# Patient Record
Sex: MALE | Race: OTHER | Employment: UNEMPLOYED | URBAN - METROPOLITAN AREA
[De-identification: names, ages, dates, MRNs, and addresses within clinical notes are randomized per-mention and may not be internally consistent; named-entity substitution may affect disease eponyms.]

---

## 2021-09-29 ENCOUNTER — HOSPITAL ENCOUNTER (EMERGENCY)
Facility: HOSPITAL | Age: 31
Discharge: HOME | End: 2021-09-29
Attending: EMERGENCY MEDICINE
Payer: MEDICAID

## 2021-09-29 VITALS
RESPIRATION RATE: 18 BRPM | BODY MASS INDEX: 39.4 KG/M2 | DIASTOLIC BLOOD PRESSURE: 68 MMHG | HEIGHT: 68 IN | WEIGHT: 260 LBS | OXYGEN SATURATION: 99 % | TEMPERATURE: 98 F | SYSTOLIC BLOOD PRESSURE: 120 MMHG | HEART RATE: 68 BPM

## 2021-09-29 DIAGNOSIS — H60.12 CELLULITIS OF LEFT EARLOBE: Primary | ICD-10-CM

## 2021-09-29 PROCEDURE — 99283 EMERGENCY DEPT VISIT LOW MDM: CPT

## 2021-09-29 RX ORDER — AMOXICILLIN AND CLAVULANATE POTASSIUM 875; 125 MG/1; MG/1
1 TABLET, FILM COATED ORAL 2 TIMES DAILY
Qty: 20 TABLET | Refills: 0 | Status: SHIPPED | OUTPATIENT
Start: 2021-09-29 | End: 2021-09-29

## 2021-09-29 RX ORDER — CLINDAMYCIN HYDROCHLORIDE 300 MG/1
300 CAPSULE ORAL ONCE
Status: COMPLETED | OUTPATIENT
Start: 2021-09-29 | End: 2021-09-29

## 2021-09-29 RX ORDER — IBUPROFEN 600 MG/1
600 TABLET ORAL EVERY 8 HOURS PRN
Qty: 15 TABLET | Refills: 0 | Status: SHIPPED | OUTPATIENT
Start: 2021-09-29 | End: 2021-10-04

## 2021-09-29 RX ORDER — AMOXICILLIN AND CLAVULANATE POTASSIUM 875; 125 MG/1; MG/1
875 TABLET, FILM COATED ORAL ONCE
Status: DISCONTINUED | OUTPATIENT
Start: 2021-09-29 | End: 2021-09-29

## 2021-09-29 RX ORDER — CLINDAMYCIN HYDROCHLORIDE 300 MG/1
300 CAPSULE ORAL 3 TIMES DAILY
Qty: 30 CAPSULE | Refills: 0 | Status: SHIPPED | OUTPATIENT
Start: 2021-09-29 | End: 2021-10-09

## 2021-09-29 NOTE — ED INITIAL ASSESSMENT (HPI)
Left earlobe swelling with drainage began yesterday. pt also with redness and tenderness behind the ear as well.

## 2021-09-29 NOTE — ED PROVIDER NOTES
Patient Seen in: [facility] AND [facility] Emergency Department      History   Patient presents with:  Ear Problem Pain  Swelling Edema    Stated Complaint: ear swelling    Subjective:   HPI    27-year-old male here with his mom for evaluation of some left lower mild swelling anterior to this in the TMJ area. There is no significant redness in this area. The mastoid is nontender. The ear canals unremarkable. Neck: Normal range of motion. Neck supple. No lymphadenopathy.   No swelling  Cardiovascular: Normal ra

## 2021-10-01 NOTE — CM/SW NOTE
Received request to assist patient with f/u ENT appointment. Pt has appointment with Dr. Lorrie Spicer today 10/01/21 @ 1:20pm in the [facility] parking lot. Pt contacted  left with appointment information.

## 2024-02-17 ENCOUNTER — OFFICE VISIT (OUTPATIENT)
Dept: FAMILY MEDICINE CLINIC | Facility: CLINIC | Age: 34
End: 2024-02-17

## 2024-02-17 VITALS
WEIGHT: 279 LBS | SYSTOLIC BLOOD PRESSURE: 120 MMHG | RESPIRATION RATE: 16 BRPM | BODY MASS INDEX: 42.28 KG/M2 | TEMPERATURE: 99 F | DIASTOLIC BLOOD PRESSURE: 71 MMHG | OXYGEN SATURATION: 98 % | HEIGHT: 68 IN | HEART RATE: 76 BPM

## 2024-02-17 DIAGNOSIS — L30.8 OTHER ECZEMA: ICD-10-CM

## 2024-02-17 DIAGNOSIS — J01.00 ACUTE MAXILLARY SINUSITIS, RECURRENCE NOT SPECIFIED: Primary | ICD-10-CM

## 2024-02-17 PROCEDURE — 99203 OFFICE O/P NEW LOW 30 MIN: CPT | Performed by: FAMILY MEDICINE

## 2024-02-17 RX ORDER — AMOXICILLIN AND CLAVULANATE POTASSIUM 875; 125 MG/1; MG/1
1 TABLET, FILM COATED ORAL 2 TIMES DAILY
Qty: 14 TABLET | Refills: 0 | Status: SHIPPED | OUTPATIENT
Start: 2024-02-17 | End: 2024-02-24

## 2024-02-17 NOTE — PROGRESS NOTES
Subjective:   Robert Morales is a 33 year old male who presents for Consult (Cold symptoms for 3 days now. Denied fever )       History/Other:    Chief Complaint Reviewed and Verified  Nursing Notes Reviewed and   Verified  Tobacco Reviewed  Allergies Reviewed  Medications Reviewed    Problem List Reviewed         Tobacco:  He smoked tobacco in the past but quit greater than 12 months ago.  Social History    Tobacco Use      Smoking status: Former      Smokeless tobacco: Never       Current Outpatient Medications   Medication Sig Dispense Refill    amoxicillin clavulanate 875-125 MG Oral Tab Take 1 tablet by mouth 2 (two) times daily for 7 days. 14 tablet 0         Review of Systems:  Review of Systems   Constitutional:  Positive for fatigue.   HENT:  Positive for congestion, sinus pressure and sinus pain.    Respiratory:  Positive for cough.          Objective:   /71   Pulse 76   Temp 98.6 °F (37 °C) (Oral)   Resp 16   Ht 5' 8\" (1.727 m)   Wt 279 lb (126.6 kg)   SpO2 98%   BMI 42.42 kg/m²  Estimated body mass index is 42.42 kg/m² as calculated from the following:    Height as of this encounter: 5' 8\" (1.727 m).    Weight as of this encounter: 279 lb (126.6 kg).  Physical Exam  Vitals reviewed.   Constitutional:       Appearance: Normal appearance.   HENT:      Head: Normocephalic.      Right Ear: Tympanic membrane normal.      Left Ear: Tympanic membrane normal.      Nose:      Right Turbinates: Swollen.      Right Sinus: Maxillary sinus tenderness present.      Left Sinus: Maxillary sinus tenderness present.   Pulmonary:      Effort: Pulmonary effort is normal.      Breath sounds: Normal breath sounds.   Neurological:      Mental Status: He is alert.           Assessment & Plan:   1. Acute maxillary sinusitis, recurrence not specified (Primary)  2. Other eczema  -     DERM - INTERNAL  Other orders  -     Amoxicillin-Pot Clavulanate; Take 1 tablet by mouth 2 (two) times daily for 7 days.   Dispense: 14 tablet; Refill: 0  Add abx and decongestant.  See back for annual .  See derm for patch of dry inflammed skin and possible psoriasis.        No follow-ups on file.    Kenneth Guo DO, 2/17/2024, 8:46 AM

## 2024-10-07 ENCOUNTER — APPOINTMENT (OUTPATIENT)
Dept: GENERAL RADIOLOGY | Facility: HOSPITAL | Age: 34
End: 2024-10-07
Attending: EMERGENCY MEDICINE
Payer: COMMERCIAL

## 2024-10-07 ENCOUNTER — HOSPITAL ENCOUNTER (EMERGENCY)
Facility: HOSPITAL | Age: 34
Discharge: HOME | End: 2024-10-07
Attending: EMERGENCY MEDICINE
Payer: COMMERCIAL

## 2024-10-07 VITALS
WEIGHT: 285 LBS | HEART RATE: 56 BPM | BODY MASS INDEX: 43.19 KG/M2 | HEIGHT: 68 IN | SYSTOLIC BLOOD PRESSURE: 110 MMHG | TEMPERATURE: 97 F | DIASTOLIC BLOOD PRESSURE: 74 MMHG | RESPIRATION RATE: 16 BRPM | OXYGEN SATURATION: 97 %

## 2024-10-07 DIAGNOSIS — M54.41 ACUTE RIGHT-SIDED LOW BACK PAIN WITH RIGHT-SIDED SCIATICA: Primary | ICD-10-CM

## 2024-10-07 LAB
BILIRUB UR QL: NEGATIVE
CLARITY UR: CLEAR
COLOR UR: YELLOW
GLUCOSE UR-MCNC: NORMAL MG/DL
HGB UR QL STRIP.AUTO: NEGATIVE
KETONES UR-MCNC: NEGATIVE MG/DL
LEUKOCYTE ESTERASE UR QL STRIP.AUTO: NEGATIVE
NITRITE UR QL STRIP.AUTO: NEGATIVE
PH UR: 5.5 [PH] (ref 5–8)
PROT UR-MCNC: NEGATIVE MG/DL
SP GR UR STRIP: 1.03 (ref 1–1.03)
UROBILINOGEN UR STRIP-ACNC: NORMAL

## 2024-10-07 PROCEDURE — 81003 URINALYSIS AUTO W/O SCOPE: CPT | Performed by: EMERGENCY MEDICINE

## 2024-10-07 PROCEDURE — 96374 THER/PROPH/DIAG INJ IV PUSH: CPT

## 2024-10-07 PROCEDURE — 99284 EMERGENCY DEPT VISIT MOD MDM: CPT

## 2024-10-07 PROCEDURE — 72220 X-RAY EXAM SACRUM TAILBONE: CPT | Performed by: EMERGENCY MEDICINE

## 2024-10-07 PROCEDURE — 72100 X-RAY EXAM L-S SPINE 2/3 VWS: CPT | Performed by: EMERGENCY MEDICINE

## 2024-10-07 RX ORDER — LIDOCAINE 50 MG/G
1 PATCH TOPICAL EVERY 24 HOURS
Qty: 14 PATCH | Refills: 0 | Status: SHIPPED | OUTPATIENT
Start: 2024-10-07 | End: 2024-10-07

## 2024-10-07 RX ORDER — METHYLPREDNISOLONE 4 MG
TABLET, DOSE PACK ORAL
Qty: 21 TABLET | Refills: 0 | Status: SHIPPED | OUTPATIENT
Start: 2024-10-07

## 2024-10-07 RX ORDER — IBUPROFEN 600 MG/1
600 TABLET, FILM COATED ORAL EVERY 6 HOURS PRN
Qty: 28 TABLET | Refills: 0 | Status: SHIPPED | OUTPATIENT
Start: 2024-10-07 | End: 2024-10-07 | Stop reason: ALTCHOICE

## 2024-10-07 RX ORDER — KETOROLAC TROMETHAMINE 30 MG/ML
30 INJECTION, SOLUTION INTRAMUSCULAR; INTRAVENOUS ONCE
Status: COMPLETED | OUTPATIENT
Start: 2024-10-07 | End: 2024-10-07

## 2024-10-07 RX ORDER — CYCLOBENZAPRINE HCL 5 MG
5 TABLET ORAL 2 TIMES DAILY PRN
Qty: 14 TABLET | Refills: 0 | Status: SHIPPED | OUTPATIENT
Start: 2024-10-07 | End: 2024-10-07 | Stop reason: ALTCHOICE

## 2024-10-07 RX ORDER — CYCLOBENZAPRINE HCL 5 MG
5 TABLET ORAL 2 TIMES DAILY PRN
Qty: 14 TABLET | Refills: 0 | Status: SHIPPED | OUTPATIENT
Start: 2024-10-07

## 2024-10-07 RX ORDER — LIDOCAINE 50 MG/G
1 PATCH TOPICAL EVERY 24 HOURS
Qty: 14 PATCH | Refills: 0 | Status: SHIPPED | OUTPATIENT
Start: 2024-10-07

## 2024-10-07 RX ORDER — METHYLPREDNISOLONE 4 MG
TABLET, DOSE PACK ORAL
Qty: 21 TABLET | Refills: 0 | Status: SHIPPED | OUTPATIENT
Start: 2024-10-07 | End: 2024-10-07 | Stop reason: ALTCHOICE

## 2024-10-07 RX ORDER — IBUPROFEN 600 MG/1
600 TABLET, FILM COATED ORAL EVERY 6 HOURS PRN
Qty: 28 TABLET | Refills: 0 | Status: SHIPPED | OUTPATIENT
Start: 2024-10-07 | End: 2024-10-14

## 2024-10-07 NOTE — DISCHARGE INSTRUCTIONS
Thank you for seeking care at [facility] Emergency Department.  You have been seen and evaluated for back pain.      We discussed the results of your workup   Please read the instructions provided   If given prescriptions, take as instructed    Remember, your care process does not end after your visit today. Please follow-up with your doctor within 1-2 days for a follow-up check to ensure you are  improving, to see if you need any further evaluation/testing, or to evaluate for any alternate diagnoses.    Please return to the emergency department immediately if you develop severe pain that is not controlled by pain medications, loss of control of your legs, if you are unable to walk because of pain or weakness, worsening numbness or weakness, loss of bowel or bladder control (dribbling of urine or having accidents you wouldn't normally have), inability to urinate, numbness of your genital or anal area, fevers, abdominal pain, change in urination, or as these could all be signs of a serious medical emergency. Call or return to the ER for any other new or worsening symptoms.

## 2024-10-07 NOTE — ED PROVIDER NOTES
Patient Seen in: [facility] Emergency Department    History     Chief Complaint   Patient presents with    Back Pain     Stated Complaint: Back pain    HPI    Chief complaint: Back pain    History of present illness:  The patient is a pleasant 34M hx obesity, sciatica presents to ED with complaints of right low back pain, that began initially a few months ago but has been worsening for the past 3-4 days. Pt notes a remote history >10 years ago of sciatica for which he underwent physical therapy, had an MRI and was offered possible surgery but declined. He has noticed for a few months however he's been frequently having right lower back pain that intermittently radiates into the R thigh and upper leg. For 3-4 days the pain has been more constant. He states yesterday the pain briefly went into his right lower abdomen and right testicle, but the abdominal/testicular pain has since completely resolved and not reoccurred today. He denies any recent discrete trauma, injury or fall but tries to stay active. Pain is described as sharp and radiating, pain is worse with movement bending and is improved with remaining still.  Currently rated 8/10.    He tried a tens machine without relief and ibuprofen without relief. A review of pertinent red flag issues reveals no history of fever, IV drug use, urinary retention, history of immunosuppressive therapy, history of cancer or weight loss. No saddle anesthesia, perianal numbness, stool/bladder incontinence, no numbness/weakness/tingling in his extremities. He denies dysuria, hematuria, flank pain, chest pain, SOB, fever, chills, vomiting, diarrhea or other complaints.       Past Medical History:    Obesity    Sciatica       History reviewed. No pertinent surgical history.         No family history on file.    Social History     Socioeconomic History    Marital status:    Tobacco Use    Smoking status: Never    Smokeless tobacco: Never   Vaping Use    Vaping status:  Every Day   Substance and Sexual Activity    Alcohol use: Never    Drug use: Yes     Types: Cannabis     Comment: daily       Review of Systems    Positive for stated complaint: Back pain  Other systems are as noted in HPI.  Constitutional and vital signs reviewed.      All other systems reviewed and negative except as noted above.    PSFH elements reviewed from today and agreed except as otherwise stated in HPI.    Physical Exam     ED Triage Vitals [10/07/24 1510]   /80   Pulse 75   Resp 20   Temp 97.2 °F (36.2 °C)   Temp src Temporal   SpO2 100 %   O2 Device None (Room air)       Current:/74   Pulse 56   Temp 97.2 °F (36.2 °C) (Temporal)   Resp 16   Ht 172.7 cm (5' 8\")   Wt 129.3 kg   SpO2 97%   BMI 43.33 kg/m²     PULSE OX 97% on RA       General: Patient appears in mild distress, slow to transition  Neck: Supple, full range of motion, no midline bony tenderness or parasinal mm tenderness   CV: RRR no MRG, 2+ DP and radial pulses bilaterally   RESP: Lungs ctab no wheezes or crackles   Abdomen: Soft nontender nondistended, no mass or prominent aortic pulsation  ; no testicular tenderness or swelling, no inguinal hernia bilaterally  Back: Tender to palpation in  R lumbar paraspinal and R lateral sacral/piriformis region, no midline bony T or L spine tenderness, no stepoff nor deformity. There is no rash nor back erythema. There is decreased range of motion secondary to pain and spasm  Straight leg tests: positive at 30 degrees of R hip flexion on the right. Negative left.   Neuro: Patient is alert and oriented x3, able to ambulate with steady gait, 5 out of 5 strength bilateral lower extremities hips knees and ankle flexion and extension, dorsiflexion and plantarflexion of the foot preserved bilateral 5 out of 5 strength, sensation intact from sacral region throughout lower extremities down to the dorsal surface of the foot, 2+ DTRs of the knee and ankle  Extremities: Nontender full range of  motion in bilateral lower extremities, no leg edema        ED Course     Labs Reviewed   URINALYSIS WITH CULTURE REFLEX   RAINBOW DRAW LAVENDER   RAINBOW DRAW LIGHT GREEN   RAINBOW DRAW BLUE       MDM     This patient presents with acutely worsening R low back and buttock pain. Triage note describes abdominal and tesicular pain, though pt clarifies he is not having these symptoms today and they only occurred yesterday. Exam is as above, pt without midline bony tenderness, no abdominal nor testicular tenderness on exam.     Differential diagnosis includes musculoskeletal strain, sciatica, herniated disc, less likely UTI or kidney stone though considered in setting of his now resolved R sided abd/testicular pain yesterday I have extremely low suspicion for testicular torsion or acute appendicitis or other intraabdominal/ process.     A review of pertinent red flags from patient history reveals no history of fever or IV drug use. No bowel or bladder incontinence or retention. No history of immunosuppressive therapy, history of cancer or weight loss. No saddle anesthesia or perianal numbness. Low suspicion for cauda equina, conus medullaris, or other spinal cord compressive syndrome. Low suspicion for spinal epidural abscess.     Low suspicion based on patient exam, risk factors and presentation for AAA, dissection.     Plan: will obtain XR L spine and XR sacrum imaging, UA, administer multi-modal pain control medicines with toradol and lido patch for further disposition. Anticipate DC pending workup in ED.       ED Course as of 10/08/24 0135  ------------------------------------------------------------  Time: 10/07 1700  Comment: UA unremarkable  ------------------------------------------------------------  Time: 10/07 1712  Value: XR SACRUM + COCCYX (MIN 2 VIEWS) (CPT=72220)  Comment:   SACRUM:   Sacrum intact.  No acute fracture or destructive lesions.    COCCYX:   Coccyx intact.  No acute fracture or destructive  lesions.  SACRO ILIAC JOINTS: Well maintained.    OTHER: Negative.                Impression  CONCLUSION: No evidence of acute fracture or osseous malalignment.       ------------------------------------------------------------  Time: 10/07 1712  Value: XR LUMBAR SPINE (MIN 2 VIEWS) (CPT=72100)  Comment: ALIGNMENT: Normal alignment.    VERTEBRAL BODIES:   No acute fracture or malalignment.  Mild degenerative change with marginal osteophyte formation and facet arthropathy.  DISC SPACES: Mild L5-S1 disc space narrowing.  SACROILIAC JOINTS: Intact.  OTHER: Negative.                Impression  CONCLUSION:     No acute fracture or malalignment lumbar spine.  Mild lumbar spine degenerative change.    ------------------------------------------------------------  Time: 10/07 1713  Comment: Patient updated with results. Counseled him to follow-up as an outpatient with primary doctor and given his history follow-up with neurosurgery for possible further imaging, physical therapy.  Medrol Dosepak, ibuprofen, flexeril, lido patches sent to pharmacy for symptomatic relief.  Return if new or worsening symptoms occur or any change in condition.  He is comfortable with the plan for discharge and strict return precautions                  Disposition and Plan     Clinical Impression:  1. Acute right-sided low back pain with right-sided sciatica        Disposition:  Discharge    Follow-up:  Vanessa Salazar MD  [address]  [address]  [place] [address]  [phone]    Schedule an appointment as soon as possible for a visit in 2 day(s)      [facility] Emergency Department  [address]  [facility] [id number] [phone]  Go to  If symptoms worsen, immediately    Kota Escobar MD  [address]  [place] [address]  [phone]    Schedule an appointment as soon as possible for a visit in 1 week(s)        Medications Prescribed:  Discharge Medication List as of 10/7/2024  5:15 PM        START  taking these medications    Details   methylPREDNISolone 4 MG Oral Tablet Therapy Pack Take as directed on dose pack instructions, Normal, Disp-21 tablet, R-0      cyclobenzaprine 5 MG Oral Tab Take 1 tablet (5 mg total) by mouth 2 (two) times daily as needed., Normal, Disp-14 tablet, R-0      lidocaine 5 % External Patch Place 1 patch onto the skin daily., Normal, Disp-14 patch, R-0      ibuprofen 600 MG Oral Tab Take 1 tablet (600 mg total) by mouth every 6 (six) hours as needed., Normal, Disp-28 tablet, R-0             Present on Admission:  **None**    Britney Soares DO  Attending Physician  Emergency Medicine

## 2024-10-07 NOTE — ED INITIAL ASSESSMENT (HPI)
Pt to ED, steady gait, alert and orientated x4.   PT reporting pain in R lower back pain to legs, R lower stomach pain radiating to testicles, x 2 -3 days. No relief from tens machine. Pain stabbing and aching, pain 8/10.

## 2025-02-21 ENCOUNTER — ORDER TRANSCRIPTION (OUTPATIENT)
Dept: PHYSICAL THERAPY | Facility: HOSPITAL | Age: 35
End: 2025-02-21

## 2025-02-21 DIAGNOSIS — M54.16 LUMBAR RADICULOPATHY: Primary | ICD-10-CM

## 2025-03-06 ENCOUNTER — TELEPHONE (OUTPATIENT)
Dept: PHYSICAL THERAPY | Facility: HOSPITAL | Age: 35
End: 2025-03-06

## 2025-03-07 ENCOUNTER — OFFICE VISIT (OUTPATIENT)
Dept: PHYSICAL THERAPY | Age: 35
End: 2025-03-07
Attending: NURSE PRACTITIONER
Payer: COMMERCIAL

## 2025-03-07 DIAGNOSIS — M54.16 LUMBAR RADICULOPATHY: Primary | ICD-10-CM

## 2025-03-07 PROCEDURE — 97110 THERAPEUTIC EXERCISES: CPT | Performed by: PHYSICAL THERAPIST

## 2025-03-07 PROCEDURE — 97162 PT EVAL MOD COMPLEX 30 MIN: CPT | Performed by: PHYSICAL THERAPIST

## 2025-03-07 NOTE — PROGRESS NOTES
SPINE EVALUATION:     Diagnosis:   Lumbar radiculopathy (M54.16) Patient:  Moises Holloway        Referring Provider: Kristine Fry Nurse practitioner Today's Date   3/7/2025    Precautions:  None   Date of Evaluation: 03/07/25  Next MD visit: 3/17/2025  Date of Surgery: none schedules     PATIENT SUMMARY   Summary of chief complaints: back tightness R side-   History of current condition: Back tightness R side from 2013 states no noted injury  - had sciatica. Had therapy and injections with no help- lived with R side LBP occasional pain into side and front of lower leg.  Presently back pain - sometimes tingling side of R leg - can come at anytime - sitting-/standing/walking. States had injection early January with no help. Has attempted exercise in the past but always made things worse.  States MD is looking at surgery to help him.   Pain level: current 4 /10, at best 4 /10, at worst 7 /10  Description of symptoms: tightness at lower R back, tingling 20% of the time - various positions   Occupation:  - sitting- sits up to 1 hour   Leisure activities/Hobbies:  kids   Prior level of function: prior to 2013 no problems  Current limitations: walking > 30-45 minutes, lifting up kids, unable to exercise, sitting > 1 hour , unable to carry.  Pt goals: 1. States get some exercises to see if can help with the pain  Past medical history was reviewed with Moises.    Imaging/Tests:   Has had MRI  Moises  has a past medical history of Obesity and Sciatica.  He  has no past surgical history on file.    ASSESSMENT  Moises presents to physical therapy evaluation with primary c/o back tightness R side. The results of the objective tests and measures show  impaired lumbar mobility flex/extension, nerve restriction LE R to L, impaired core strength, impaired hip abd strength R to L, tenderness R lower lumbar region to R and c/o pain and intermittent radiculopathy R leg.. Functional deficits  include but are not limited to walking > 30-45 minutes, lifting up kids, unable to exercise, sitting > 1 hour , unable to carry.. Signs and symptoms are consistent with diagnosis of Lumbar radiculopathy (M54.16). Pt and PT discussed evaluation findings, pathology, POC and HEP.  Pt voiced understanding and performs HEP correctly without reported pain. Skilled Physical Therapy is medically necessary to address the above impairments and reach functional goals.    OBJECTIVE:    Musculoskeletal:  Observation/Posture:  fair sitting posture with frwd head/flexed lumbar spine, forward shoulders.      Palpation:  tenderness R lower lumbar region to L     Repeated movements lumbar spine:  Standing Baseline:  R side LBP/tightness.  Repeated flexion - NE   Repeated Extension-  I/W tightness R side  Prone - R side tightness, JOCELIN- I/NW prone press ups I/NW     Supine hook lying - D/B    repeated flexion- NE      ROM and Strength:  (* denotes performed with pain)  Trunk ROM MMT (-/5)     Flex to just below knee       Ext mod LOM      R L R L     Side bend WNL mild LOM R side tightness         Rotation WNL WNL       ,   Hip   ROM MMT (-/5)    R L R L     Flex (L2)  WNL  WNL  5/5  5/5     Ext   WNL  WNL  5/5- P, NW tingling  5/5     Abd  WNL  WNL  4/5  5/5     ER  WNL  WNL  5/5  5/5     IR  WNL  WNL  4+/5  5/5    ,   Knee   ROM MMT (-/5)    R L R L     Flex  WNL  WNL  5/5  5/5     Ext (L3)  WNL  WNL  /5  5/5     ,   Ankle/Foot   ROM MMT (-/5)    R L R L     PF  WNL  WNL         DF (L4)  WNL  WNL  5/5  5/5     Inversion  WNL  WNL  5/5  5/5     Eversion  WNL  WNL  5/5  5/5     Grt Toe Ext (L5)               Flexibility:  LE Flexibility R L     Hip Flexor min restricted min restricted     Hamstrings significantly restricted, (+) SLR mod restricted, -35     ITB not tested not tested     Piriformis not tested not tested     Quads WNL WNL     Gastroc-soleus           Neurological:  Sensation:  intact           Balance and Functional  Mobility:  Gait: pt ambulates on level ground with  normal pattern.          Today's Treatment and Response:   Pt education was provided on exam findings, treatment diagnosis, treatment plan, expectations, and prognosis.  Today's Treatment       3/7/2025   Spine Treatment   Therapeutic Exercise Minutes 10   Evaluation Minutes 40   Total Time Of Timed Procedures 10   Total Time Of Service-Based Procedures 40   Total Treatment Time 50   HEP Seated nerve glides  Lower abd core with march        Patient was instructed in and issued a HEP for: Slump -Seated nerve glides  Lower abd core with march    Charges:  PT EVAL:  ,    In agreement with evaluation findings and clinical rationale, this evaluation involved MODERATE COMPLEXITY decision making due to 1-2 personal factors/comorbidities, 3 or more body structures involved/activity limitations, and evolving symptoms as documented in the evaluation.                                                                         PLAN OF CARE:    Goals: (to be met in 8 visits)   Patient to be independent with HEP  Patient to report decreased radicular symptoms into R leg  Patient to report 25-50% decrease in R side back tightness with daily activities.     Frequency / Duration: Patient will be seen 2x/week or a total of 6- 8  visits over a 90 day period. Treatment will include: Therapeutic Exercise, HEP    Education or treatment limitation: None   Rehab Potential: fair     Oswestry Disability Index Score  Score: (Patient-Rptd) 36 % (3/7/2025  7:56 AM)      Patient was advised of these findings, precautions, and treatment options and has agreed to actively participate in planning and for this course of care.    Thank you for your referral. Please co-sign or sign and return this letter via fax as soon as possible to [phone]. If you have any questions, please contact me at Dept: [phone]    Sincerely,  Electronically signed by therapist: Jennifer Lay, PT  Physician's  certification required: Yes  I certify the need for these services furnished under this plan of treatment and while under my care.    X___________________________________________________ Date____________________    Certification From: 3/7/2025  To:6/5/2025

## 2025-03-10 ENCOUNTER — TELEPHONE (OUTPATIENT)
Dept: PHYSICAL THERAPY | Facility: HOSPITAL | Age: 35
End: 2025-03-10

## 2025-03-11 ENCOUNTER — APPOINTMENT (OUTPATIENT)
Dept: PHYSICAL THERAPY | Age: 35
End: 2025-03-11
Attending: NURSE PRACTITIONER
Payer: COMMERCIAL

## 2025-03-13 ENCOUNTER — OFFICE VISIT (OUTPATIENT)
Dept: PHYSICAL THERAPY | Age: 35
End: 2025-03-13
Attending: NURSE PRACTITIONER
Payer: COMMERCIAL

## 2025-03-13 PROCEDURE — 97110 THERAPEUTIC EXERCISES: CPT | Performed by: PHYSICAL THERAPIST

## 2025-03-13 NOTE — PROGRESS NOTES
Patient: Moises Holloway  Referring Provider:  Insurance:   Diagnosis: Lumbar radiculopathy (M54.16) Kristine BELTRAN   Date of Surgery: N/A Next MD visit:  N/A   Precautions:  None March 17, 2025 Referral Information:    Date of Evaluation: Req: 0, Auth: 0, Exp:     03/07/25 POC Auth Visits:  8       Today's Date   3/13/2025    Subjective  Patient reports having pain R side of back today, no reported tingling into the leg.       Pain: 4/10  R side back     Objective  see outpatient daily record          Assessment   Patient given additional stabilization and flexibility exercises this session.  Only complaint at time was R side back stiffness, NW by end of session.  Able to demonstrate all exercises correctly.     Goals (to be met in 8 visits)   Patient to be independent with HEP  Patient to report decreased radicular symptoms into R leg  Patient to report 25-50% decrease in R side back tightness with daily activities.         Plan   Continue flexibility, stabilization exercises.      Treatment Last 4 Visits       3/13/2025   PT Treatment   Treatment Day 2          3/7/2025 3/13/2025   Spine Treatment   Treatment Day  2   Therapeutic Exercise  Nu step 5 min level 5  Nerve glides - slump position x 10  Piriformis stretch long sitting   March x 10 each leg  Dead bug x 10 reps   Side glut med 2 x 10 YTB with abdominal activation  Bridges x 10  Single knee to chest x 10  Isometric hip flex supine x 8 each  Hip add with ball with abd activation  Hook lying rotation  4 point hip extension x 10 each  Seated HS stretch x 30 seconds     Therapeutic Exercise Minutes 10 40   Evaluation Minutes 40    Total Time Of Timed Procedures 10 40   Total Time Of Service-Based Procedures 40 0   Total Treatment Time 50 40   HEP Seated nerve glides  Lower abd core with march         HEP  Seated HS stretch, hip add with abd activation, dead bug, side glut med with abd activation, isometric hip flex supine, SKTC, hook lying  rotation, 4 point hip extension    Charges      Ex 3

## 2025-03-14 ENCOUNTER — APPOINTMENT (OUTPATIENT)
Dept: PHYSICAL THERAPY | Age: 35
End: 2025-03-14
Attending: NURSE PRACTITIONER
Payer: COMMERCIAL

## 2025-03-17 ENCOUNTER — OFFICE VISIT (OUTPATIENT)
Dept: SURGERY | Facility: CLINIC | Age: 35
End: 2025-03-17

## 2025-03-17 VITALS — HEART RATE: 72 BPM | DIASTOLIC BLOOD PRESSURE: 90 MMHG | SYSTOLIC BLOOD PRESSURE: 118 MMHG

## 2025-03-17 DIAGNOSIS — M25.531 WRIST PAIN, ACUTE, RIGHT: Primary | ICD-10-CM

## 2025-03-17 PROCEDURE — 99203 OFFICE O/P NEW LOW 30 MIN: CPT | Performed by: SURGERY

## 2025-03-17 RX ORDER — NAPROXEN 500 MG/1
500 TABLET ORAL 2 TIMES DAILY WITH MEALS
Qty: 30 TABLET | Refills: 0 | Status: SHIPPED | OUTPATIENT
Start: 2025-03-17 | End: 2025-04-01

## 2025-03-17 RX ORDER — GABAPENTIN 300 MG/1
1 CAPSULE ORAL 3 TIMES DAILY
COMMUNITY
Start: 2024-12-09

## 2025-03-17 RX ORDER — GABAPENTIN 100 MG/1
100 CAPSULE ORAL
COMMUNITY
Start: 2025-01-28

## 2025-03-17 NOTE — H&P
History and Physical      HPI     Chief Complaint   Patient presents with    Consult     Cyst on right wrist       HPI  Moises Holloway is a 34 year old male who presents with right wrist and proximal forearm tendinitis.  He admits to numbness and weakness in this area.  He had an MRI at an offsite I cannot review the report.  Clinically not consistent with ganglion cyst for which she was referred.  Will send to hand surgery for evaluation possible injection.    Past Medical History:    Obesity    Sciatica     No past surgical history on file.  Current Outpatient Medications   Medication Sig Dispense Refill    gabapentin 100 MG Oral Cap Take 1 capsule (100 mg total) by mouth.      gabapentin 300 MG Oral Cap Take 1 capsule (300 mg total) by mouth 3 (three) times daily.      naproxen 500 MG Oral Tab Take 1 tablet (500 mg total) by mouth 2 (two) times daily with meals for 30 doses. 30 tablet 0    methylPREDNISolone 4 MG Oral Tablet Therapy Pack Take as directed on dose pack instructions (Patient not taking: Reported on 3/17/2025) 21 tablet 0    cyclobenzaprine 5 MG Oral Tab Take 1 tablet (5 mg total) by mouth 2 (two) times daily as needed. (Patient not taking: Reported on 3/17/2025) 14 tablet 0    lidocaine 5 % External Patch Place 1 patch onto the skin daily. (Patient not taking: Reported on 3/17/2025) 14 patch 0     ALLERGIES  Allergies[1]    Social History     Socioeconomic History    Marital status:    Tobacco Use    Smoking status: Never     Passive exposure: Never    Smokeless tobacco: Never   Vaping Use    Vaping status: Every Day   Substance and Sexual Activity    Alcohol use: Never    Drug use: Yes     Types: Cannabis     Comment: daily     No family history on file.    Review of Systems   A comprehensive 10 point review of systems was completed.  Pertinent positives and negatives noted in the the HPI.    PHYSICAL EXAM   /90 (BP Location: Right arm, Patient Position: Sitting, Cuff Size:  large)   Pulse 72  No LMP for male patient.   Constitutional: appears well hydrated alert and responsive no acute distress noted  Head/Face: normocephalic  Nose/Mouth/Throat: nose and throat are clear palate is intact mucous membranes are moist no oral lesions are noted  Neck/Thyroid: neck is supple without adenopathy  Respiratory: normal to inspection lungs are clear to auscultation bilaterally normal respiratory effort  Cardiovascular: regular rate and rhythm no murmurs, gallups, or rubs  Abdomen: soft non-tender non-distended no organomegaly noted no masses  Extremities: no edema, cyanosis, or clubbing  Tender near anatomical snuffbox  Neurological: exam appropriate for age reflexes and motor skills appropriate for age      ASSESSMENT/PLAN   Assessment       34 year old male with probable tendinitis  We have discussed the surgical risks, benefits, alternatives, and expected recovery. We will plan evaluation with hand surgery. All of the patient's questions have been answered to his satisfaction.       3/17/2025  Carl Martel MD               [1] No Known Allergies

## 2025-03-17 NOTE — PATIENT INSTRUCTIONS
Please take naproxen 500 mg twice a day.    Make an appointment with Dr. Yanick Aggarwal from hand surgery [phone]

## 2025-03-25 ENCOUNTER — OFFICE VISIT (OUTPATIENT)
Dept: PHYSICAL THERAPY | Age: 35
End: 2025-03-25
Attending: NURSE PRACTITIONER
Payer: COMMERCIAL

## 2025-03-25 PROCEDURE — 97110 THERAPEUTIC EXERCISES: CPT | Performed by: PHYSICAL THERAPIST

## 2025-03-25 NOTE — PROGRESS NOTES
Patient: Moises Holloway   Referring Provider:  Insurance:   Diagnosis: Lumbar radiculopathy (M54.16) Kristine Fry   NP [employer]   Date of Surgery: No data recorded Next MD visit:  N/A   Precautions:  None No data recorded Referral Information:    Date of Evaluation: Req: 0, Auth: 0, Exp:     03/07/25 POC Auth Visits:  8       Today's Date   3/25/2025    Subjective  Patient reports 5/10 today, more locked up that usual.  No reported tingling into the leg. Did see MD and said to continue out the therapy.  Will see again in mid April and plan the surgery.       Pain: 5/10     Objective  see outpatient daily record          Assessment   Patient able to complete all exercises without c/o tingling into leg, pain levels end of session.  Did report less stiffness at the back end of session.     Goals (to be met in 8 visits)   Patient to be independent with HEP  Patient to report decreased radicular symptoms into R leg  Patient to report 25-50% decrease in R side back tightness with daily activities.             Plan  Continue with core strengthening, flexibility work.    Treatment Last 4 Visits       3/13/2025 3/25/2025   PT Treatment   Treatment Day 2 3          3/7/2025 3/13/2025 3/25/2025   Spine Treatment   Treatment Day  2 3   Therapeutic Exercise  Nu step 5 min level 5  Nerve glides - slump position x 10  Piriformis stretch long sitting   March x 10 each leg  Dead bug x 10 reps   Side glut med 2 x 10 YTB with abdominal activation  Bridges x 10  Single knee to chest x 10  Isometric hip flex supine x 8 each  Hip add with ball with abd activation  Hook lying rotation  4 point hip extension x 10 each  Seated HS stretch x 30 seconds   Nu step 5 min level 5  Nerve glides - slump position x 10  HS stretch at step 2 x 45 seconds R, 1 x 45 sec L.  Piriformis stretch supine 2 each leg  March x 10 each leg with abd activation  Dead bug x 10 reps   Side glut med 2 x 10 YTB with abdominal activation  Bridges x 10  Single  knee to chest x 10 reps  Isometric hip flex supine x 8 each  Hip add with ball with abd activation x 12 reps  Hip Abd with pro [place] x 12 reps with abd activation  Hook lying rotation  4 point hip extension x 12 each  Seated HS stretch x 30 seconds  Standing hip abd YTB x 12 each leg  Standing hip ext YTB x 12 each leg   Therapeutic Exercise Minutes 10 40 40   Evaluation Minutes 40     Total Time Of Timed Procedures 10 40 40   Total Time Of Service-Based Procedures 40 0 0   Total Treatment Time 50 40 40   HEP Seated nerve glides  Lower abd core with march          HEP      Charges  Ex 3

## 2025-03-28 ENCOUNTER — OFFICE VISIT (OUTPATIENT)
Dept: PHYSICAL THERAPY | Age: 35
End: 2025-03-28
Attending: NURSE PRACTITIONER
Payer: COMMERCIAL

## 2025-03-28 PROCEDURE — 97110 THERAPEUTIC EXERCISES: CPT | Performed by: PHYSICAL THERAPIST

## 2025-03-28 NOTE — PROGRESS NOTES
Patient: Moises Holloway  Referring Provider:  Insurance:   Diagnosis: Lumbar radiculopathy (M54.16) Kristine BELTRAN   Date of Surgery: NA Next MD visit:  N/A   Precautions:  None No data recorded Referral Information:    Date of Evaluation: Req: 0, Auth: 0, Exp:     03/07/25 POC Auth Visits:  4/8       Today's Date   3/28/2025    Subjective  Patient reports more tender today than normal.  States just woke up that way.  States overall has continued to be tight. Exercises help a short time, then tightens again  Patient reports after exercises sometimes good at back and sometimes gets tighter.        Pain: 6/10     Objective  see outpatient daily record          Assessment   Patient reporting ongoing tightness at times and some help for a time with the stretches, but returns.  Added standing flexion to HEP    Goals (to be met in 8 visits)   Patient to be independent with HEP  Patient to report decreased radicular symptoms into R leg  Patient to report 25-50% decrease in R side back tightness with daily activities.                 Plan   Continue LE flexibility, nerve glides, core strengthening, LE strengthening/stabilization.    Treatment Last 4 Visits  Treatment Day: 4       3/7/2025 3/13/2025 3/25/2025 3/28/2025   Spine Treatment   Therapeutic Exercise  Nu step 5 min level 5  Nerve glides - slump position x 10  Piriformis stretch long sitting   March x 10 each leg  Dead bug x 10 reps   Side glut med 2 x 10 YTB with abdominal activation  Bridges x 10  Single knee to chest x 10  Isometric hip flex supine x 8 each  Hip add with ball with abd activation  Hook lying rotation  4 point hip extension x 10 each  Seated HS stretch x 30 seconds   Nu step 5 min level 5  Nerve glides - slump position x 10  HS stretch at step 2 x 45 seconds R, 1 x 45 sec L.  Piriformis stretch supine 2 each leg  March x 10 each leg with abd activation  Dead bug x 10 reps   Side glut med 2 x 10 YTB with abdominal  activation  Bridges x 10  Single knee to chest x 10 reps  Isometric hip flex supine x 8 each  Hip add with ball with abd activation x 12 reps  Hip Abd with pro [place] x 12 reps with abd activation  Hook lying rotation  4 point hip extension x 12 each  Seated HS stretch x 30 seconds  Standing hip abd YTB x 12 each leg  Standing hip ext YTB x 12 each leg Nu step 5 min level 5  HS stretch at step 2 x 45   Nerve glides - slump position x 10    Piriformis stretch supine 2 each leg- 30 sec hold  March x 10 each leg with abd activation  SLR with abd activation x 10 each leg  Dead bug x 10 reps - 8# weight held in both arms  Side glut med 2 x 10 YTB with abdominal activation  Bridges x 10  Single knee to chest x 10 reps  Isometric hip flex supine x 8 each  Hip add with ball with abd activation x 15 reps  Hip Abd with pro [place] x 12 reps with abd activation  Hook lying rotation x 10  4 point hip extension x 12 each  Standing flexion x 5 - DB walking  Standing hip abd YTB x 15 each leg  Standing hip ext YTB x 12 each leg   Therapeutic Exercise Minutes 10 40 40 40   Evaluation Minutes 40      Total Time Of Timed Procedures 10 40 40 40   Total Time Of Service-Based Procedures 40 0 0 0   Total Treatment Time 50 40 40 40   HEP Seated nerve glides  Lower abd core with march   Standing flexion x 5 reps through day.        HEP  Standing flexion x 5 reps through day.    Charges      Ex 3

## 2025-04-01 ENCOUNTER — TELEPHONE (OUTPATIENT)
Dept: PHYSICAL THERAPY | Facility: HOSPITAL | Age: 35
End: 2025-04-01

## 2025-04-01 ENCOUNTER — APPOINTMENT (OUTPATIENT)
Dept: PHYSICAL THERAPY | Age: 35
End: 2025-04-01
Attending: NURSE PRACTITIONER
Payer: COMMERCIAL

## 2025-04-08 ENCOUNTER — OFFICE VISIT (OUTPATIENT)
Dept: PHYSICAL THERAPY | Age: 35
End: 2025-04-08
Attending: NURSE PRACTITIONER
Payer: COMMERCIAL

## 2025-04-08 PROCEDURE — 97110 THERAPEUTIC EXERCISES: CPT | Performed by: PHYSICAL THERAPIST

## 2025-04-08 NOTE — PROGRESS NOTES
Patient: Moises Holloway  Referring Provider:  Insurance:   Diagnosis: Lumbar radiculopathy (M54.16) Kristine BELTRAN   Date of Surgery: NA Next MD visit:  N/A   Precautions:  None No data recorded Referral Information:    Date of Evaluation: Req: 0, Auth: 0, Exp:     03/07/25 POC Auth Visits:  8       Today's Date   4/8/2025    Subjective   Patient reports tightness continues.  States had to fly out to [place] last week and that made back tight along with walking over the last weekend.  .         Pain: 6/106/10 in the back R    Objective     See outpatient daily record.           Assessment   Continued stabilization and flexibility work.  End of session reporting 4/10 levels.  Added theraball for bridges this session.     Goals (to be met in 8 visits)   Patient to be independent with HEP  Patient to report decreased radicular symptoms into R leg  Patient to report 25-50% decrease in R side back tightness with daily activities.                     Plan   Continue stabilization, strengthening, core, flexibility work.    Treatment Last 4 Visits  Treatment Day: 5       3/13/2025 3/25/2025 3/28/2025 4/8/2025   Spine Treatment   Therapeutic Exercise Nu step 5 min level 5  Nerve glides - slump position x 10  Piriformis stretch long sitting   March x 10 each leg  Dead bug x 10 reps   Side glut med 2 x 10 YTB with abdominal activation  Bridges x 10  Single knee to chest x 10  Isometric hip flex supine x 8 each  Hip add with ball with abd activation  Hook lying rotation  4 point hip extension x 10 each  Seated HS stretch x 30 seconds   Nu step 5 min level 5  Nerve glides - slump position x 10  HS stretch at step 2 x 45 seconds R, 1 x 45 sec L.  Piriformis stretch supine 2 each leg  March x 10 each leg with abd activation  Dead bug x 10 reps   Side glut med 2 x 10 YTB with abdominal activation  Bridges x 10  Single knee to chest x 10 reps  Isometric hip flex supine x 8 each  Hip add with ball with abd activation  x 12 reps  Hip Abd with pro [place] x 12 reps with abd activation  Hook lying rotation  4 point hip extension x 12 each  Seated HS stretch x 30 seconds  Standing hip abd YTB x 12 each leg  Standing hip ext YTB x 12 each leg Nu step 5 min level 5  HS stretch at step 2 x 45   Nerve glides - slump position x 10    Piriformis stretch supine 2 each leg- 30 sec hold  March x 10 each leg with abd activation  SLR with abd activation x 10 each leg  Dead bug x 10 reps - 8# weight held in both arms  Side glut med 2 x 10 YTB with abdominal activation  Bridges x 10  Single knee to chest x 10 reps  Isometric hip flex supine x 8 each  Hip add with ball with abd activation x 15 reps  Hip Abd with pro [place] x 12 reps with abd activation  Hook lying rotation x 10  4 point hip extension x 12 each  Standing flexion x 5 - DB walking  Standing hip abd YTB x 15 each leg  Standing hip ext YTB x 12 each leg Nu step 5 min level 6  HS stretch at step 2 x 45   Nerve glides - slump position x 15     Piriformis stretch supine 2 each leg- 30 sec hold  March x 10 each leg with abd activation  SLR with abd activation x 10 each leg  Dead bug x 10 reps - 10# weight held in both arms  Side glut med 2 x 15 RTB with abdominal activation  Bridges on theraball 2 x 10  Single knee to chest x 10 reps  Isometric hip flex supine x 10 each  Hip add with ball with abd activation x 10 reps  Hip Abd with pro [place] x 10 reps with abd activation  4 point hip extension x 10each  Standing flexion x 5 - DB walking  Standing hip abd YTB x 10 each leg  Standing hip ext YTB x 10 each leg   Therapeutic Exercise Minutes 40 40 40 40   Total Time Of Timed Procedures 40 40 40 40   Total Time Of Service-Based Procedures 0 0 0 0   Total Treatment Time 40 40 40 40   HEP   Standing flexion x 5 reps through day.         HEP  No changes    Charges   Ex 3

## 2025-04-10 ENCOUNTER — OFFICE VISIT (OUTPATIENT)
Dept: PHYSICAL THERAPY | Age: 35
End: 2025-04-10
Attending: NURSE PRACTITIONER
Payer: COMMERCIAL

## 2025-04-10 PROCEDURE — 97110 THERAPEUTIC EXERCISES: CPT | Performed by: PHYSICAL THERAPIST

## 2025-04-10 NOTE — PROGRESS NOTES
Patient: Moises Holloway (34 year old, male) Referring Provider:  Insurance:   Diagnosis: Lumbar radiculopathy (M54.16) Kristine BELTRAN   Date of Surgery: No data recorded Next MD visit:  N/A   Precautions:  None No data recorded Referral Information:    Date of Evaluation: Req: 0, Auth: 0, Exp:     03/07/25 POC Auth Visits:  8       Today's Date   4/10/2025    Subjective   Patient reports the same overall. States next day after therapy sometimes will feel leg irritated.  States still notices weakness at R leg compared to L       Pain: 4/10     Objective       See outpatient daily record.      Assessment   Continued LE flexibility, strengthening/core stabilization work.     Goals (to be met in 8 visits)   Patient to be independent with HEP  Patient to report decreased radicular symptoms into R leg  Patient to report 25-50% decrease in R side back tightness with daily activities.                       Plan  Continue with core strengthening, flexibility work.    Treatment Last 4 Visits  Treatment Day: 6       3/25/2025 3/28/2025 4/8/2025 4/10/2025   Spine Treatment   Therapeutic Exercise Nu step 5 min level 5  Nerve glides - slump position x 10  HS stretch at step 2 x 45 seconds R, 1 x 45 sec L.  Piriformis stretch supine 2 each leg  March x 10 each leg with abd activation  Dead bug x 10 reps   Side glut med 2 x 10 YTB with abdominal activation  Bridges x 10  Single knee to chest x 10 reps  Isometric hip flex supine x 8 each  Hip add with ball with abd activation x 12 reps  Hip Abd with pro [place] x 12 reps with abd activation  Hook lying rotation  4 point hip extension x 12 each  Seated HS stretch x 30 seconds  Standing hip abd YTB x 12 each leg  Standing hip ext YTB x 12 each leg Nu step 5 min level 5  HS stretch at step 2 x 45   Nerve glides - slump position x 10    Piriformis stretch supine 2 each leg- 30 sec hold  March x 10 each leg with abd activation  SLR with abd activation x 10 each leg  Dead  bug x 10 reps - 8# weight held in both arms  Side glut med 2 x 10 YTB with abdominal activation  Bridges x 10  Single knee to chest x 10 reps  Isometric hip flex supine x 8 each  Hip add with ball with abd activation x 15 reps  Hip Abd with pro [place] x 12 reps with abd activation  Hook lying rotation x 10  4 point hip extension x 12 each  Standing flexion x 5 - DB walking  Standing hip abd YTB x 15 each leg  Standing hip ext YTB x 12 each leg Nu step 5 min level 6  HS stretch at step 2 x 45   Nerve glides - slump position x 15     Piriformis stretch supine 2 each leg- 30 sec hold  March x 10 each leg with abd activation  SLR with abd activation x 10 each leg  Dead bug x 10 reps - 10# weight held in both arms  Side glut med 2 x 15 RTB with abdominal activation  Bridges on theraball 2 x 10  Single knee to chest x 10 reps  Isometric hip flex supine x 10 each  Hip add with ball with abd activation x 10 reps  Hip Abd with pro [place] x 10 reps with abd activation  4 point hip extension x 10each  Standing flexion x 5 - DB walking  Standing hip abd YTB x 10 each leg  Standing hip ext YTB x 10 each leg Nu step 5 min level 6  HS stretch at step 2 x 45   Nerve glides - slump position x 15     Piriformis stretch supine 2 each leg- 30 sec hold  March x 10 each leg with abd activation  SLR with abd activation 2 x 10 each leg  Dead bug x 10 reps - 10# weight held in both arms  Theraball isometric abd x 15 R hand to L knee, L hand to R knee  Side glut med 2 x 15 RTB with abdominal activation  Bridges on theraball 2 x 10  Single knee to chest x 10 reps  Hip add with ball with abd activation x 10 reps  Hip Abd with pro [place] x 15 reps with abd activation  4 point hip extension x 12 each  Standing slant board stretchx 45 seconds  Standing flexion x 5 -   Standing RF stretch 30 sec each leg.  Standing hip abd RTB x 15 each leg  Standing hip ext YTB x 10 each leg     Therapeutic Exercise Minutes 40 40 40    Total Time Of Timed  Procedures 40 40 40 0   Total Time Of Service-Based Procedures 0 0 0 0   Total Treatment Time 40 40 40 0   HEP  Standing flexion x 5 reps through day.          HEP  No changes    Charges   Ex 3

## 2025-04-15 ENCOUNTER — OFFICE VISIT (OUTPATIENT)
Dept: PHYSICAL THERAPY | Age: 35
End: 2025-04-15
Attending: INTERNAL MEDICINE
Payer: COMMERCIAL

## 2025-04-15 PROCEDURE — 97110 THERAPEUTIC EXERCISES: CPT | Performed by: PHYSICAL THERAPIST

## 2025-04-15 NOTE — PROGRESS NOTES
Patient: Moises Holloway (34 year old, male) Referring Provider:  Insurance:   Diagnosis: Lumbar radiculopathy (M54.16) Kristine BELTRAN   Date of Surgery: NA Next MD visit:  N/A   Precautions:  None No data recorded Referral Information:    Date of Evaluation: Req: 0, Auth: 0, Exp:     03/07/25 POC Auth Visits:  8       Today's Date   4/15/2025    Subjective  Patient reports pain at 7/10 at R side of back, states difficulty with bending.  States over weekend was progressively worsening from Saturday on.  Didn't do anything too stressful over the weekend, just did some shopping which makes the R side tender.       Pain: 7/10     Objective  see outpatient daily record          Assessment   Patient continues with ongoing pain limiting activities and motion.  Is able to perform exercises in department without having an increase in pain.      Goals (to be met in 8 visits)   Patient to be independent with HEP  Patient to report decreased radicular symptoms into R leg  Patient to report 25-50% decrease in R side back tightness with daily activities.                         Plan   Reassess next session as patient returns to MD following week.    Treatment Last 4 Visits  Treatment Day: 7       3/28/2025 4/8/2025 4/10/2025 4/15/2025   Spine Treatment   Therapeutic Exercise Nu step 5 min level 5  HS stretch at step 2 x 45   Nerve glides - slump position x 10    Piriformis stretch supine 2 each leg- 30 sec hold  March x 10 each leg with abd activation  SLR with abd activation x 10 each leg  Dead bug x 10 reps - 8# weight held in both arms  Side glut med 2 x 10 YTB with abdominal activation  Bridges x 10  Single knee to chest x 10 reps  Isometric hip flex supine x 8 each  Hip add with ball with abd activation x 15 reps  Hip Abd with pro [place] x 12 reps with abd activation  Hook lying rotation x 10  4 point hip extension x 12 each  Standing flexion x 5 - DB walking  Standing hip abd YTB x 15 each leg  Standing hip ext  YTB x 12 each leg Nu step 5 min level 6  HS stretch at step 2 x 45   Nerve glides - slump position x 15     Piriformis stretch supine 2 each leg- 30 sec hold  March x 10 each leg with abd activation  SLR with abd activation x 10 each leg  Dead bug x 10 reps - 10# weight held in both arms  Side glut med 2 x 15 RTB with abdominal activation  Bridges on theraball 2 x 10  Single knee to chest x 10 reps  Isometric hip flex supine x 10 each  Hip add with ball with abd activation x 10 reps  Hip Abd with pro [place] x 10 reps with abd activation  4 point hip extension x 10each  Standing flexion x 5 - DB walking  Standing hip abd YTB x 10 each leg  Standing hip ext YTB x 10 each leg Nu step 5 min level 6  HS stretch at step 2 x 45   Nerve glides - slump position x 15     Piriformis stretch supine 2 each leg- 30 sec hold  March x 10 each leg with abd activation  SLR with abd activation 2 x 10 each leg  Dead bug x 10 reps - 10# weight held in both arms  Theraball isometric abd x 15 R hand to L knee, L hand to R knee  Side glut med 2 x 15 RTB with abdominal activation  Bridges on theraball 2 x 10  Single knee to chest x 10 reps  Hip add with ball with abd activation x 10 reps  Hip Abd with pro [place] x 15 reps with abd activation  4 point hip extension x 12 each  Standing slant board stretchx 45 seconds  Standing flexion x 5 -   Standing RF stretch 30 sec each leg.  Standing hip abd RTB x 15 each leg  Standing hip ext YTB x 10 each leg   Nu step 6 min level 6  HS stretch at step 2 x 45   Nerve glides - slump position x 15     Piriformis stretch supine 2 each leg- 30 sec hold  SLR with abd activation 2 x 10 each leg  Dead bug x 10 reps - 10# weight held in both arms  Theraball isometric abd x 15 R hand to L knee, L hand to R knee  Side glut med 2 x 15 RTB with abdominal activation  Bridges on theraball 2 x 10  Single knee to chest x 10 reps  Hip add with ball with abd activation x 10 reps  Hip Abd with pro [place] x 15 reps with  abd activation  4 point bird dog  x 12 each  Standing slant board stretchx 45 seconds  Standing flexion x 5 -   Standing RF stretch 30 sec each leg.  Standing hip abd RTB x 15 each leg  Standing hip ext RTB x 10 each leg     Therapeutic Exercise Minutes 40 40  40   Total Time Of Timed Procedures 40 40 0 40   Total Time Of Service-Based Procedures 0 0 0 0   Total Treatment Time 40 40 0 40   HEP Standing flexion x 5 reps through day.           HEP  No changes    Charges   Ex 3

## 2025-04-23 ENCOUNTER — OFFICE VISIT (OUTPATIENT)
Dept: PHYSICAL THERAPY | Age: 35
End: 2025-04-23
Attending: NURSE PRACTITIONER
Payer: COMMERCIAL

## 2025-04-23 PROCEDURE — 97110 THERAPEUTIC EXERCISES: CPT | Performed by: PHYSICAL THERAPIST

## 2025-04-23 NOTE — PROGRESS NOTES
Patient: Moises Holloway (34 year old, male) Referring Provider:  Insurance:   Diagnosis: Lumbar radiculopathy (M54.16) Kristine BELTRAN   Date of Surgery: No data recorded Next MD visit:  N/A   Precautions:  None No data recorded Referral Information:    Date of Evaluation: Req: 0, Auth: 0, Exp:     03/07/25 POC Auth Visits:  8    DISCHARGE SUMMARY - no changes  Worked on flexibility, ROM and strengthening/stabilization exercises.    Today's Date   4/23/2025    Subjective   Patient reports no change overall with any symptoms.  Sees MD next week for follow up for possible surgery       Pain:  3/10     Objective       See outpatient daily record.      Assessment   No changes from initial evaluation except for improved mobility into flexion.  Patient is to follow up with MD regarding possibility of surgery.    Goals (to be met in 8 visits)   Patient to be independent with HEP  Patient to report decreased radicular symptoms into R leg  Patient to report 25-50% decrease in R side back tightness with daily activities.                           Plan   Discharge to independent [facility], follow up with MD    Treatment Last 4 Visits  Treatment Day: 8        4/8/2025 4/10/2025 4/15/2025 4/23/2025   Spine Treatment   Therapeutic Exercise Nu step 5 min level 6  HS stretch at step 2 x 45   Nerve glides - slump position x 15     Piriformis stretch supine 2 each leg- 30 sec hold  March x 10 each leg with abd activation  SLR with abd activation x 10 each leg  Dead bug x 10 reps - 10# weight held in both arms  Side glut med 2 x 15 RTB with abdominal activation  Bridges on theraball 2 x 10  Single knee to chest x 10 reps  Isometric hip flex supine x 10 each  Hip add with ball with abd activation x 10 reps  Hip Abd with pro [place] x 10 reps with abd activation  4 point hip extension x 10each  Standing flexion x 5 - DB walking  Standing hip abd YTB x 10 each leg  Standing hip ext YTB x 10 each leg Nu step 5 min level 6  HS  stretch at step 2 x 45   Nerve glides - slump position x 15     Piriformis stretch supine 2 each leg- 30 sec hold  March x 10 each leg with abd activation  SLR with abd activation 2 x 10 each leg  Dead bug x 10 reps - 10# weight held in both arms  Theraball isometric abd x 15 R hand to L knee, L hand to R knee  Side glut med 2 x 15 RTB with abdominal activation  Bridges on theraball 2 x 10  Single knee to chest x 10 reps  Hip add with ball with abd activation x 10 reps  Hip Abd with pro [place] x 15 reps with abd activation  4 point hip extension x 12 each  Standing slant board stretchx 45 seconds  Standing flexion x 5 -   Standing RF stretch 30 sec each leg.  Standing hip abd RTB x 15 each leg  Standing hip ext YTB x 10 each leg   Nu step 6 min level 6  HS stretch at step 2 x 45   Nerve glides - slump position x 15     Piriformis stretch supine 2 each leg- 30 sec hold  SLR with abd activation 2 x 10 each leg  Dead bug x 10 reps - 10# weight held in both arms  [place] isometric abd x 15 R hand to L knee, L hand to R knee  Side glut med 2 x 15 RTB with abdominal activation  Bridges on theraball 2 x 10  Single knee to chest x 10 reps  Hip add with ball with abd activation x 10 reps  Hip Abd with pro [place] x 15 reps with abd activation  4 point bird dog  x 12 each  Standing slant board stretchx 45 seconds  Standing flexion x 5 -   Standing RF stretch 30 sec each leg.  Standing hip abd RTB x 15 each leg  Standing hip ext RTB x 10 each leg   Nu step 6 min level 5  HS stretch at step 1 x 45   Nerve glides - slump position x 10   Piriformis stretch supine 2 each leg- 30 sec hold  SLR with abd activation 2 x 10 each leg  Dead bug x 10 reps - 10# weight held in both arms  [place] isometric abd x 15 R hand to L knee, L hand to R knee  Side glut med 2 x 15 RTB with abdominal activation  Bridges on theraball 2 x 10  Single knee to chest x 3 reps each  Hip Abd with pro [place] x 10 reps with abd activation  4 point bird dog   x 10 each  Standing slant board stretchx 45 seconds  Standing flexion x 5 -   Standing RF stretch 30 sec each leg.  Standing hip abd RTB x 15 each leg  Standing hip ext RTB x 10 each leg     Therapeutic Exercise Minutes 40  40 40   Total Time Of Timed Procedures 40 0 40 40   Total Time Of Service-Based Procedures 0 0 0 0   Total Treatment Time 40 0 40 40        HEP  Independent HEP    Charges    Ex 3

## (undated) NOTE — LETTER
Patient Name: Moises Holloway  YOB: 1990          MRN :  [id number]  Date:  4/23/2025  Referring Physician:  Kristine Fry                 21st Century Cures Act Notice to Patient: Medical documents like this are made available to patients in the interest of transparency. However, be advised this is a medical document and it is intended as [id number] communication between your medical providers. This medical document may contain abbreviations, assessments, medical data, and results or other terms that are unfamiliar. Medical documents are intended to carry relevant information, facts as evident, and the clinical opinion of the practitioner. As such, this medical document may be written in language that appears blunt or direct. You are encouraged to contact your medical provider and/or [facility] Patient Experience if you have any questions about this medical document.

## (undated) NOTE — LETTER
Patient Name: Moises Holloway  YOB: 1990          MRN :  [id number]  Date:  4/23/2025  Referring Physician:  Kristine Fry                 21st Century Cures Act Notice to Patient: Medical documents like this are made available to patients in the interest of transparency. However, be advised this is a medical document and it is intended as [id number] communication between your medical providers. This medical document may contain abbreviations, assessments, medical data, and results or other terms that are unfamiliar. Medical documents are intended to carry relevant information, facts as evident, and the clinical opinion of the practitioner. As such, this medical document may be written in language that appears blunt or direct. You are encouraged to contact your medical provider and/or [facility] Patient Experience if you have any questions about this medical document.